# Patient Record
Sex: MALE | Race: OTHER | Employment: PART TIME | URBAN - METROPOLITAN AREA
[De-identification: names, ages, dates, MRNs, and addresses within clinical notes are randomized per-mention and may not be internally consistent; named-entity substitution may affect disease eponyms.]

---

## 2019-05-17 ENCOUNTER — APPOINTMENT (OUTPATIENT)
Dept: GENERAL RADIOLOGY | Age: 39
End: 2019-05-17
Payer: COMMERCIAL

## 2019-05-17 ENCOUNTER — HOSPITAL ENCOUNTER (EMERGENCY)
Age: 39
Discharge: HOME | End: 2019-05-17
Payer: COMMERCIAL

## 2019-05-17 VITALS
DIASTOLIC BLOOD PRESSURE: 99 MMHG | RESPIRATION RATE: 14 BRPM | OXYGEN SATURATION: 100 % | HEART RATE: 73 BPM | TEMPERATURE: 98.9 F | SYSTOLIC BLOOD PRESSURE: 151 MMHG

## 2019-05-17 DIAGNOSIS — M65.9 FLEXOR TENOSYNOVITIS OF FINGER: Primary | ICD-10-CM

## 2019-05-17 PROCEDURE — 73140 X-RAY EXAM OF FINGER(S): CPT

## 2019-05-17 PROCEDURE — 99283 EMERGENCY DEPT VISIT LOW MDM: CPT

## 2019-05-17 PROCEDURE — 6370000000 HC RX 637 (ALT 250 FOR IP): Performed by: NURSE PRACTITIONER

## 2019-05-17 RX ORDER — IBUPROFEN 800 MG/1
800 TABLET ORAL EVERY 8 HOURS PRN
Qty: 20 TABLET | Refills: 0 | Status: SHIPPED | OUTPATIENT
Start: 2019-05-17

## 2019-05-17 RX ORDER — LISINOPRIL 10 MG/1
10 TABLET ORAL DAILY
COMMUNITY

## 2019-05-17 RX ORDER — IBUPROFEN 800 MG/1
800 TABLET ORAL ONCE
Status: COMPLETED | OUTPATIENT
Start: 2019-05-17 | End: 2019-05-17

## 2019-05-17 RX ADMIN — IBUPROFEN 800 MG: 800 TABLET, FILM COATED ORAL at 09:07

## 2019-05-17 ASSESSMENT — ENCOUNTER SYMPTOMS
ABDOMINAL PAIN: 0
NAUSEA: 0
SHORTNESS OF BREATH: 0
DIARRHEA: 0
CHEST TIGHTNESS: 0
VOMITING: 0

## 2019-05-17 ASSESSMENT — PAIN DESCRIPTION - ORIENTATION: ORIENTATION: RIGHT

## 2019-05-17 ASSESSMENT — PAIN SCALES - GENERAL
PAINLEVEL_OUTOF10: 8
PAINLEVEL_OUTOF10: 8

## 2019-05-17 ASSESSMENT — PAIN DESCRIPTION - LOCATION: LOCATION: HAND

## 2019-05-17 NOTE — ED PROVIDER NOTES
difficult to straighten   All other systems reviewed and are negative. Positives and Pertinent negatives as per HPI. Except as noted abovein the ROS, all other systems were reviewed and negative. PAST MEDICAL HISTORY   History reviewed. No pertinent past medical history. SURGICAL HISTORY   History reviewed. No pertinent surgical history. [address]       Discharge Medication List as of 5/17/2019  9:12 AM      CONTINUE these medications which have NOT CHANGED    Details   lisinopril (PRINIVIL;ZESTRIL) 10 MG tablet Take 10 mg by mouth dailyHistorical Med               ALLERGIES     Patient has no known allergies. FAMILYHISTORY     History reviewed. No pertinent family history.        SOCIAL HISTORY       Social History     Socioeconomic History    Marital status: Single     Spouse name: None    Number of children: None    Years of education: None    Highest education level: None   Occupational History    None   Social Needs    Financial resource strain: None    Food insecurity:     Worry: None     Inability: None    Transportation needs:     Medical: None     Non-medical: None   Tobacco Use    Smoking status: Never Smoker    Smokeless tobacco: Never Used   Substance and Sexual Activity    Alcohol use: None    Drug use: None    Sexual activity: None   Lifestyle    Physical activity:     Days per week: None     Minutes per session: None    Stress: None   Relationships    Social connections:     Talks on phone: None     Gets together: None     Attends Jainism service: None     Active member of club or organization: None     Attends meetings of clubs or organizations: None     Relationship status: None    Intimate partner violence:     Fear of current or ex partner: None     Emotionally abused: None     Physically abused: None     Forced sexual activity: None   Other Topics Concern    None   Social History Narrative    None       SCREENINGS             PHYSICAL EXAM (up to 7 for level 4, 8 or more for level 5)     ED Triage Vitals [05/17/19 0806]   BP Temp Temp Source Pulse Resp SpO2 Height Weight   (!) 151/99 98.9 °F (37.2 °C) Infrared 73 14 100 % -- --       Physical Exam   Constitutional: He is oriented to person, place, and time. He appears well-developed and well-nourished. HENT:   Head: Normocephalic and atraumatic. Right Ear: External ear normal.   Left Ear: External ear normal.   Eyes: Right eye exhibits no discharge. Left eye exhibits no discharge. Neck: Normal range of motion. Neck supple. No JVD present. Cardiovascular: Normal rate and regular rhythm. Exam reveals no friction rub. No murmur heard. Pulmonary/Chest: Effort normal and breath sounds normal. No stridor. No respiratory distress. He has no wheezes. Abdominal: Soft. He exhibits no mass. There is no tenderness. Musculoskeletal: Normal range of motion. Hands:  Neurological: He is alert and oriented to person, place, and time. Skin: Skin is warm and dry. He is not diaphoretic. No pallor. Psychiatric: He has a normal mood and affect. His behavior is normal.   Nursing note and vitals reviewed. DIAGNOSTIC RESULTS   LABS:    Labs Reviewed - No data to display    All other labs were within normal range or not returned as of this dictation. EKG: All EKG's are interpreted by the Emergency Department Physician who either signs orCo-signs this chart in the absence of a cardiologist.  Please see their note for interpretation of EKG. RADIOLOGY:   Non-plain film images such as CT, Ultrasound and MRI are read by the radiologist. Plain radiographic images are visualized andpreliminarily interpreted by the  ED Provider with the below findings:        Interpretation [facility] Radiologist below, if available at the time of this note:    XR FINGER RIGHT (MIN 2 VIEWS)   Final Result   No acute osseous or soft tissue abnormality. No results found.        PROCEDURES   Unless otherwise noted below, none     Procedures    CRITICAL CARE TIME   N/A    CONSULTS:  None      EMERGENCY DEPARTMENT COURSE and DIFFERENTIALDIAGNOSIS/MDM:   Vitals:    Vitals:    05/17/19 0806   BP: (!) 151/99   Pulse: 73   Resp: 14   Temp: 98.9 °F (37.2 °C)   TempSrc: Infrared   SpO2: 100%       Patient was given thefollowing medications:  Medications   ibuprofen (ADVIL;MOTRIN) tablet 800 mg (800 mg Oral Given 5/17/19 0907)       Briefly, this is a 41year old male that presents to the emergency department with the right ring finger in a flexed locked position. States that this occurs every morning upon wakening for the past 1 week estimated newly straightened although it will not remain in the extended position. He reports it is painful at the base of the finger. Denies injury or trauma. There is no swelling. Denies history of previous event. He is right-hand dominant. Patient was given Motrin. X-ray shows no acute osseous abnormality or soft tissue abnormality. He is placed in an auto form splint. This is classic trigger finger. He is instructed on rehab exercises and follow up with orthopedics as well as splinting. He does verbalize understanding. I estimate there is LOW risk for FRACTURE, COMPARTMENT SYNDROME, DEEP VENOUS THROMBOSIS, SEPTIC ARTHRITIS, TENDON OR NEUROVASCULAR INJURY, thus I consider the discharge disposition reasonable. FINAL IMPRESSION      1.  Flexor tenosynovitis of finger          DISPOSITION/PLAN   DISPOSITION Decision To Discharge 05/17/2019 08:49:26 AM      PATIENT REFERREDTO:  Amanda Galvez MD  [address] [place] [address], [address]  [address] [place] [address]  [phone]    Schedule an appointment as soon as possible for a visit         DISCHARGE MEDICATIONS:  Discharge Medication List as of 5/17/2019  9:12 AM      START taking these medications    Details   ibuprofen (ADVIL;MOTRIN) 800 MG tablet Take 1 tablet by mouth every 8 hours as needed for Pain or Fever, Disp-20 tablet, R-0Print DISCONTINUED MEDICATIONS:  Discharge Medication List as of 5/17/2019  9:12 AM                 (Please note that portions ofthis note were completed with a voice recognition program.  Efforts were made to edit the dictations but occasionally words are mis-transcribed.)    TONI Agrawal CNP (electronically signed)           TONI Agrawal CNP  05/17/19 7392

## 2019-05-17 NOTE — ED NOTES
Pt. discharged in stable position. Pt. provided with discharge instructions and prescriptions. Given opportunity to ask questions if needed and verbalized understanding. Pt. Ambulated to exit per self.         Orlando Rico RN  05/17/19 4050

## 2019-08-14 ENCOUNTER — OFFICE VISIT (OUTPATIENT)
Dept: ORTHOPEDIC SURGERY | Age: 39
End: 2019-08-14
Payer: COMMERCIAL

## 2019-08-14 VITALS
DIASTOLIC BLOOD PRESSURE: 83 MMHG | SYSTOLIC BLOOD PRESSURE: 120 MMHG | RESPIRATION RATE: 16 BRPM | BODY MASS INDEX: 33.13 KG/M2 | WEIGHT: 180 LBS | HEIGHT: 62 IN

## 2019-08-14 DIAGNOSIS — M65.341 TRIGGER RING FINGER OF RIGHT HAND: ICD-10-CM

## 2019-08-14 PROCEDURE — 20550 NJX 1 TENDON SHEATH/LIGAMENT: CPT | Performed by: PHYSICIAN ASSISTANT

## 2019-08-14 PROCEDURE — G8427 DOCREV CUR MEDS BY ELIG CLIN: HCPCS | Performed by: PHYSICIAN ASSISTANT

## 2019-08-14 PROCEDURE — 1036F TOBACCO NON-USER: CPT | Performed by: PHYSICIAN ASSISTANT

## 2019-08-14 PROCEDURE — G8419 CALC BMI OUT NRM PARAM NOF/U: HCPCS | Performed by: PHYSICIAN ASSISTANT

## 2019-08-14 PROCEDURE — 99203 OFFICE O/P NEW LOW 30 MIN: CPT | Performed by: PHYSICIAN ASSISTANT

## 2019-08-14 NOTE — PATIENT INSTRUCTIONS
Information & Instructions   After Finger, Hand, Wrist, or Elbow Injection    Doug Nix MD    You have received an injection of local anesthetic (Bupivicaine without Epinephrine) for comfort & a steroid (Kenalog) for its strong anti-inflammatory effects. In order to give the medication a chance to reduce your inflammation and discomfort, it is recommended that you take it easy for a day or so. You may use your hand and arm as you feel comfortable, but you should avoid highly strenuous activity and heavy use for several days. Relief from the injection will often not begin for several days, and you may not feel full relief for up to one month. It is not uncommon to experience some local discomfort or pain at or around the injection site for a few days. To relieve these symptoms you may do the following if you feel necessary:       Apply ice to the affected area 20 minutes on and 20 minutes off. Do not apply ice directly to the skin. Use a thin layer (T-shirt, pillowcase, towel, etc.) to protect the skin. - If allowed by your other medical physicians, you may take -     2 Tylenol extra strength tablets every 4-6 hours       1-2 Aleve tablets twice a day     2-3 Advil tablets two to three times a day    If you are diabetic, the steroid medication may increase your blood sugar, so you are advised to monitor your sugar more closely so you can adjust it accordingly for a few days following your injection. If you need assistance with the control of you blood sugar, please contact you primary care physician for further advice. I will request that you please call the office one month after your injection at [phone] if you have not experienced relief of your symptoms (unless I have instructed you otherwise). If your injection has given you good relief of you symptoms as expected, then you only need to call the office if your symptoms return.

## 2019-08-14 NOTE — LETTER
Witness               o  Parent  o  Guardian   o  Spouse       o  Other (specify)                                    Patient Name: More Sharp  Patient YOB: 1980    Dr. Karli Duval' Return To Work Policy  Regarding your ability to return to work after surgery or injury, Dr. Karli Duval will not state that any patient is off of work or cannot work at all. He will place you on restrictions after your surgical procedure or injury. This means that you will be allowed to return to work the day after your office visit or surgery with restrictions. Depending on the details of your particular situation, Dr. Karli Duval may state that you will have either light use or no use of your hand for a specific number of weeks. It is your obligation to communicate with your employer regarding your restrictions. It is your employer's decision as to whether they will accommodate your restrictions (i.e. allow you to come to work in your restricted capacity) or to not allow you to return to work under your restrictions. Dr. Karli Duval does not participate in making this decision and cannot influence your employer regarding their decision. If you do not communicate your restrictions to your employer, or if you do not present to work as you are scheduled to, Dr. Karli Duval will not provide an 'excuse' to explain your absence. A doctors note, or official forms (BWC, FMLA, etc.) will be filled out, upon request, to indicate your date of surgery and your restrictions as stated above. Dr. Karli Duval' Narcotic Policy  Patients will only be prescribed narcotics after surgical procedures or significant injury. Not all procedures cause pain great enough to require Narcotics and thus, not all patients will receive prescriptions after surgical procedures or injuries. Narcotics are never prescribed for chronic conditions.  Narcotics are never prescribed for use longer than one

## 2019-11-04 ENCOUNTER — ANESTHESIA EVENT (OUTPATIENT)
Dept: OPERATING ROOM | Age: 39
End: 2019-11-04
Payer: COMMERCIAL

## 2019-11-05 ENCOUNTER — ANESTHESIA (OUTPATIENT)
Dept: OPERATING ROOM | Age: 39
End: 2019-11-05
Payer: COMMERCIAL

## 2019-11-05 ENCOUNTER — HOSPITAL ENCOUNTER (OUTPATIENT)
Age: 39
Setting detail: OUTPATIENT SURGERY
Discharge: HOME | End: 2019-11-05
Attending: ORTHOPAEDIC SURGERY | Admitting: ORTHOPAEDIC SURGERY
Payer: COMMERCIAL

## 2019-11-05 VITALS
RESPIRATION RATE: 18 BRPM | TEMPERATURE: 97.2 F | DIASTOLIC BLOOD PRESSURE: 96 MMHG | OXYGEN SATURATION: 99 % | HEART RATE: 69 BPM | WEIGHT: 200 LBS | SYSTOLIC BLOOD PRESSURE: 116 MMHG | HEIGHT: 66 IN | BODY MASS INDEX: 32.14 KG/M2

## 2019-11-05 VITALS — SYSTOLIC BLOOD PRESSURE: 125 MMHG | DIASTOLIC BLOOD PRESSURE: 71 MMHG | OXYGEN SATURATION: 96 %

## 2019-11-05 PROCEDURE — 7100000011 HC PHASE II RECOVERY - ADDTL 15 MIN: Performed by: ORTHOPAEDIC SURGERY

## 2019-11-05 PROCEDURE — 3700000000 HC ANESTHESIA ATTENDED CARE: Performed by: ORTHOPAEDIC SURGERY

## 2019-11-05 PROCEDURE — 7100000010 HC PHASE II RECOVERY - FIRST 15 MIN: Performed by: ORTHOPAEDIC SURGERY

## 2019-11-05 PROCEDURE — 2580000003 HC RX 258: Performed by: ANESTHESIOLOGY

## 2019-11-05 PROCEDURE — 7100000001 HC PACU RECOVERY - ADDTL 15 MIN: Performed by: ORTHOPAEDIC SURGERY

## 2019-11-05 PROCEDURE — 2500000003 HC RX 250 WO HCPCS: Performed by: NURSE ANESTHETIST, CERTIFIED REGISTERED

## 2019-11-05 PROCEDURE — 2500000003 HC RX 250 WO HCPCS: Performed by: ORTHOPAEDIC SURGERY

## 2019-11-05 PROCEDURE — 3600000005 HC SURGERY LEVEL 5 BASE: Performed by: ORTHOPAEDIC SURGERY

## 2019-11-05 PROCEDURE — 7100000000 HC PACU RECOVERY - FIRST 15 MIN: Performed by: ORTHOPAEDIC SURGERY

## 2019-11-05 PROCEDURE — 6360000002 HC RX W HCPCS: Performed by: NURSE ANESTHETIST, CERTIFIED REGISTERED

## 2019-11-05 PROCEDURE — 2709999900 HC NON-CHARGEABLE SUPPLY: Performed by: ORTHOPAEDIC SURGERY

## 2019-11-05 RX ORDER — SODIUM CHLORIDE 0.9 % (FLUSH) 0.9 %
10 SYRINGE (ML) INJECTION EVERY 12 HOURS SCHEDULED
Status: DISCONTINUED | OUTPATIENT
Start: 2019-11-05 | End: 2019-11-05 | Stop reason: HOSPADM

## 2019-11-05 RX ORDER — PROPOFOL 10 MG/ML
INJECTION, EMULSION INTRAVENOUS PRN
Status: DISCONTINUED | OUTPATIENT
Start: 2019-11-05 | End: 2019-11-05 | Stop reason: SDUPTHER

## 2019-11-05 RX ORDER — LIDOCAINE HYDROCHLORIDE 20 MG/ML
INJECTION, SOLUTION INFILTRATION; PERINEURAL PRN
Status: DISCONTINUED | OUTPATIENT
Start: 2019-11-05 | End: 2019-11-05 | Stop reason: SDUPTHER

## 2019-11-05 RX ORDER — SODIUM CHLORIDE 0.9 % (FLUSH) 0.9 %
10 SYRINGE (ML) INJECTION PRN
Status: DISCONTINUED | OUTPATIENT
Start: 2019-11-05 | End: 2019-11-05 | Stop reason: HOSPADM

## 2019-11-05 RX ORDER — IBUPROFEN 400 MG/1
800 TABLET ORAL
Status: DISCONTINUED | OUTPATIENT
Start: 2019-11-05 | End: 2019-11-05 | Stop reason: HOSPADM

## 2019-11-05 RX ORDER — ONDANSETRON 2 MG/ML
4 INJECTION INTRAMUSCULAR; INTRAVENOUS
Status: DISCONTINUED | OUTPATIENT
Start: 2019-11-05 | End: 2019-11-05 | Stop reason: HOSPADM

## 2019-11-05 RX ORDER — SODIUM CHLORIDE 9 MG/ML
INJECTION, SOLUTION INTRAVENOUS CONTINUOUS
Status: DISCONTINUED | OUTPATIENT
Start: 2019-11-05 | End: 2019-11-05 | Stop reason: HOSPADM

## 2019-11-05 RX ADMIN — SODIUM CHLORIDE: 9 INJECTION, SOLUTION INTRAVENOUS at 06:37

## 2019-11-05 RX ADMIN — LIDOCAINE HYDROCHLORIDE 50 MG: 20 INJECTION, SOLUTION INFILTRATION; PERINEURAL at 07:25

## 2019-11-05 RX ADMIN — PROPOFOL 175 MG: 10 INJECTION, EMULSION INTRAVENOUS at 07:25

## 2019-11-05 ASSESSMENT — PAIN DESCRIPTION - PAIN TYPE
TYPE: ACUTE PAIN
TYPE: ACUTE PAIN

## 2019-11-05 ASSESSMENT — PAIN SCALES - GENERAL
PAINLEVEL_OUTOF10: 0
PAINLEVEL_OUTOF10: 0
PAINLEVEL_OUTOF10: 3
PAINLEVEL_OUTOF10: 3

## 2019-11-05 ASSESSMENT — PULMONARY FUNCTION TESTS
PIF_VALUE: 0
PIF_VALUE: 1
PIF_VALUE: 0
PIF_VALUE: 1

## 2019-11-05 ASSESSMENT — PAIN DESCRIPTION - LOCATION
LOCATION: HAND
LOCATION: HAND

## 2019-11-05 ASSESSMENT — ENCOUNTER SYMPTOMS: SHORTNESS OF BREATH: 0

## 2019-11-05 ASSESSMENT — PAIN DESCRIPTION - PROGRESSION
CLINICAL_PROGRESSION: NOT CHANGED
CLINICAL_PROGRESSION: GRADUALLY WORSENING

## 2019-11-05 ASSESSMENT — PAIN - FUNCTIONAL ASSESSMENT
PAIN_FUNCTIONAL_ASSESSMENT: ACTIVITIES ARE NOT PREVENTED
PAIN_FUNCTIONAL_ASSESSMENT: ACTIVITIES ARE NOT PREVENTED
PAIN_FUNCTIONAL_ASSESSMENT: 0-10

## 2019-11-05 ASSESSMENT — PAIN DESCRIPTION - ONSET
ONSET: ON-GOING
ONSET: ON-GOING

## 2019-11-05 ASSESSMENT — PAIN DESCRIPTION - DESCRIPTORS
DESCRIPTORS: ACHING
DESCRIPTORS: ACHING

## 2019-11-05 ASSESSMENT — PAIN DESCRIPTION - ORIENTATION
ORIENTATION: RIGHT
ORIENTATION: RIGHT

## 2019-11-05 ASSESSMENT — PAIN DESCRIPTION - FREQUENCY
FREQUENCY: INTERMITTENT
FREQUENCY: INTERMITTENT

## 2019-11-13 ENCOUNTER — OFFICE VISIT (OUTPATIENT)
Dept: ORTHOPEDIC SURGERY | Age: 39
End: 2019-11-13

## 2019-11-13 VITALS — BODY MASS INDEX: 33.13 KG/M2 | WEIGHT: 180 LBS | RESPIRATION RATE: 16 BRPM | HEIGHT: 62 IN

## 2019-11-13 DIAGNOSIS — M65.341 TRIGGER RING FINGER OF RIGHT HAND: Primary | ICD-10-CM

## 2019-11-13 PROCEDURE — APPNB15 APP NON BILLABLE TIME 0-15 MINS: Performed by: PHYSICIAN ASSISTANT

## 2019-11-13 PROCEDURE — 99024 POSTOP FOLLOW-UP VISIT: CPT | Performed by: PHYSICIAN ASSISTANT

## 2019-11-13 RX ORDER — AMLODIPINE BESYLATE 10 MG/1
TABLET ORAL
COMMUNITY

## 2019-11-13 RX ORDER — LISINOPRIL AND HYDROCHLOROTHIAZIDE 12.5; 1 MG/1; MG/1
TABLET ORAL
Refills: 1 | COMMUNITY
Start: 2019-11-11

## 2019-11-13 RX ORDER — LISINOPRIL 5 MG/1
TABLET ORAL
COMMUNITY

## 2019-11-13 RX ORDER — ATORVASTATIN CALCIUM 40 MG/1
TABLET, FILM COATED ORAL
COMMUNITY
Start: 2019-09-09

## 2020-01-04 ENCOUNTER — HOSPITAL ENCOUNTER (EMERGENCY)
Age: 40
Discharge: HOME | End: 2020-01-05
Payer: COMMERCIAL

## 2020-01-04 LAB
BILIRUBIN URINE: NEGATIVE
BLOOD, URINE: ABNORMAL
CLARITY: CLEAR
COLOR: YELLOW
EPITHELIAL CELLS, UA: 0 /HPF (ref 0–5)
GLUCOSE URINE: NEGATIVE MG/DL
HYALINE CASTS: 2 /LPF (ref 0–8)
KETONES, URINE: NEGATIVE MG/DL
LEUKOCYTE ESTERASE, URINE: NEGATIVE
MICROSCOPIC EXAMINATION: YES
NITRITE, URINE: NEGATIVE
PH UA: 5 (ref 5–8)
PROTEIN UA: 100 MG/DL
RAPID INFLUENZA  B AGN: NEGATIVE
RAPID INFLUENZA A AGN: NEGATIVE
RBC UA: 1 /HPF (ref 0–4)
SPECIFIC GRAVITY UA: 1.01 (ref 1–1.03)
URINE REFLEX TO CULTURE: ABNORMAL
URINE TYPE: ABNORMAL
UROBILINOGEN, URINE: 0.2 E.U./DL
WBC UA: 0 /HPF (ref 0–5)

## 2020-01-04 PROCEDURE — 99284 EMERGENCY DEPT VISIT MOD MDM: CPT

## 2020-01-04 PROCEDURE — 87804 INFLUENZA ASSAY W/OPTIC: CPT

## 2020-01-04 PROCEDURE — 87880 STREP A ASSAY W/OPTIC: CPT

## 2020-01-04 PROCEDURE — 6370000000 HC RX 637 (ALT 250 FOR IP): Performed by: PHYSICIAN ASSISTANT

## 2020-01-04 PROCEDURE — 81001 URINALYSIS AUTO W/SCOPE: CPT

## 2020-01-04 RX ORDER — IBUPROFEN 800 MG/1
800 TABLET ORAL ONCE
Status: COMPLETED | OUTPATIENT
Start: 2020-01-04 | End: 2020-01-04

## 2020-01-04 RX ORDER — ACETAMINOPHEN 500 MG
1000 TABLET ORAL ONCE
Status: COMPLETED | OUTPATIENT
Start: 2020-01-04 | End: 2020-01-04

## 2020-01-04 RX ADMIN — ACETAMINOPHEN 1000 MG: 500 TABLET ORAL at 23:23

## 2020-01-04 RX ADMIN — IBUPROFEN 800 MG: 800 TABLET, FILM COATED ORAL at 23:23

## 2020-01-04 ASSESSMENT — PAIN DESCRIPTION - LOCATION: LOCATION: HEAD

## 2020-01-04 ASSESSMENT — PAIN SCALES - GENERAL
PAINLEVEL_OUTOF10: 10
PAINLEVEL_OUTOF10: 10

## 2020-01-05 ENCOUNTER — APPOINTMENT (OUTPATIENT)
Dept: GENERAL RADIOLOGY | Age: 40
End: 2020-01-05
Payer: COMMERCIAL

## 2020-01-05 VITALS
SYSTOLIC BLOOD PRESSURE: 145 MMHG | WEIGHT: 205.13 LBS | DIASTOLIC BLOOD PRESSURE: 87 MMHG | RESPIRATION RATE: 16 BRPM | BODY MASS INDEX: 37.52 KG/M2 | OXYGEN SATURATION: 98 % | HEART RATE: 116 BPM | TEMPERATURE: 98.5 F

## 2020-01-05 LAB — S PYO AG THROAT QL: POSITIVE

## 2020-01-05 PROCEDURE — 71046 X-RAY EXAM CHEST 2 VIEWS: CPT

## 2020-01-05 RX ORDER — AZITHROMYCIN 250 MG/1
250 TABLET, FILM COATED ORAL SEE ADMIN INSTRUCTIONS
Qty: 6 TABLET | Refills: 0 | Status: SHIPPED | OUTPATIENT
Start: 2020-01-05 | End: 2020-01-10

## 2020-01-05 RX ORDER — CYCLOBENZAPRINE HCL 10 MG
10 TABLET ORAL 3 TIMES DAILY PRN
Qty: 20 TABLET | Refills: 0 | Status: SHIPPED | OUTPATIENT
Start: 2020-01-05 | End: 2020-01-12

## 2020-01-05 RX ORDER — PENICILLIN V POTASSIUM 500 MG/1
500 TABLET ORAL 2 TIMES DAILY
Qty: 20 TABLET | Refills: 0 | Status: SHIPPED | OUTPATIENT
Start: 2020-01-05 | End: 2020-01-05

## 2020-01-05 ASSESSMENT — ENCOUNTER SYMPTOMS
ABDOMINAL PAIN: 0
VOMITING: 0
NAUSEA: 0
SHORTNESS OF BREATH: 0

## 2020-01-05 NOTE — ED PROVIDER NOTES
Musculoskeletal: Negative. Skin: Negative. Neurological: Negative. Positives and Pertinent negatives as per HPI. Except as noted above in the ROS, all other systems were reviewed and negative. PAST MEDICAL HISTORY     Past Medical History:   Diagnosis Date    Claustrophobia     Hypertension          SURGICAL HISTORY     Past Surgical History:   Procedure Laterality Date    FINGER TRIGGER RELEASE Right 11/05/2019    RIGHT RING FINGER TRIGGER FINGER RELEASE (Right )    FINGER TRIGGER RELEASE Right 11/5/2019    RIGHT RING FINGER TRIGGER FINGER RELEASE performed by Katherine Holbrook MD at [address] [place] [address]       Previous Medications    AMLODIPINE (NORVASC) 10 MG TABLET    amlodipine 10 mg tablet   Take 1 tablet every day by oral route in the morning. ATORVASTATIN (LIPITOR) 40 MG TABLET        IBUPROFEN (ADVIL;MOTRIN) 800 MG TABLET    Take 1 tablet by mouth every 8 hours as needed for Pain or Fever    LISINOPRIL (PRINIVIL;ZESTRIL) 10 MG TABLET    Take 10 mg by mouth daily    LISINOPRIL (PRINIVIL;ZESTRIL) 5 MG TABLET    lisinopril 5 mg tablet   Take 1 tablet every day by oral route. LISINOPRIL-HYDROCHLOROTHIAZIDE (PRINZIDE;ZESTORETIC) 10-12.5 MG PER TABLET    TK 1 T PO  QD    METOPROLOL TARTRATE (LOPRESSOR) 25 MG TABLET    25 mg 2 times daily Has not started yet wants to wait until after surgery         ALLERGIES     Patient has no known allergies.     FAMILYHISTORY       Family History   Problem Relation Age of Onset    High Blood Pressure Mother     High Blood Pressure Father           SOCIAL HISTORY       Social History     Tobacco Use    Smoking status: Never Smoker    Smokeless tobacco: Never Used   Substance Use Topics    Alcohol use: Yes     Comment: rarely    Drug use: Never       SCREENINGS             PHYSICAL EXAM    (up to 7 for level 4, 8 or more for level 5)     ED Triage Vitals [01/04/20 2049]   BP Temp Temp Source Pulse Resp SpO2 Height Weight   (!) 145/87 99 °F (37.2 °C) Infrared 137 16 96 % -- 205 lb 2 oz (93 kg)       Physical Exam  Vitals signs and nursing note reviewed. Constitutional:       General: He is not in acute distress. Appearance: Normal appearance. He is well-developed. He is ill-appearing. He is not toxic-appearing or diaphoretic. HENT:      Head: Normocephalic and atraumatic. Right Ear: Tympanic membrane, ear canal and external ear normal.      Left Ear: Tympanic membrane, ear canal and external ear normal.      Nose: Nose normal.      Mouth/Throat:      Mouth: Mucous membranes are moist.      Pharynx: Posterior oropharyngeal erythema present. No pharyngeal swelling, oropharyngeal exudate or uvula swelling. Tonsils: Tonsillar exudate present. No tonsillar abscesses. Swellin+ on the right. 1+ on the left. Eyes:      General:         Right eye: No discharge. Left eye: No discharge. Neck:      Musculoskeletal: Normal range of motion and neck supple. Cardiovascular:      Rate and Rhythm: Regular rhythm. Tachycardia present. Heart sounds: Normal heart sounds. No murmur. No gallop. Pulmonary:      Effort: Pulmonary effort is normal. No respiratory distress. Breath sounds: Normal breath sounds. No wheezing or rales. Musculoskeletal: Normal range of motion. Skin:     General: Skin is warm and dry. Capillary Refill: Capillary refill takes less than 2 seconds. Coloration: Skin is not pale. Neurological:      General: No focal deficit present. Mental Status: He is alert and oriented to person, place, and time.    Psychiatric:         Behavior: Behavior normal.         DIAGNOSTIC RESULTS   LABS:    Labs Reviewed   STREP SCREEN GROUP A THROAT - Abnormal; Notable for the following components:       Result Value    Rapid Strep A Screen POSITIVE (*)     All other components within normal limits    Narrative:     Performed at:  [facility]  [address], [place], [facility]   Phone [phone]   URINE RT REFLEX TO CULTURE - Abnormal; Notable for the following components:    Blood, Urine SMALL (*)     Protein,  (*)     All other components within normal limits    Narrative:     Performed at:  [facility]  [address]  [place], [facility]   Phone [phone]   RAPID INFLUENZA A/B ANTIGENS    Narrative:     Performed at:  [facility]  [address], [facility]   Phone [phone]   MICROSCOPIC URINALYSIS    Narrative:     Performed at:  [facility]  [address] [facility], [place] [employer]   Phone [phone]       All other labs were within normal range or not returned as of this dictation. EKG: All EKG's are interpreted by the Emergency Department Physician in the absence of a cardiologist.  Please see their note for interpretation of EKG. RADIOLOGY:   Non-plain film images such as CT, Ultrasound and MRI are read by the radiologist. Plain radiographic images are visualized and preliminarily interpreted by the  ED Provider with the below findings:        Interpretation per the Radiologist below, if available at the time of this note:    No orders to display     No results found.         PROCEDURES   Unless otherwise noted below, none     Procedures    CRITICAL CARE TIME   N/A    CONSULTS:  None      EMERGENCY DEPARTMENT COURSE and DIFFERENTIAL DIAGNOSIS/MDM:   Vitals:    Vitals:    01/04/20 2049 01/04/20 2304 01/04/20 2307   BP: (!) 145/87     Pulse: 137  104   Resp: 16     Temp: 99 °F (37.2 °C) 101 °F (38.3 °C)    TempSrc: Infrared Oral    SpO2: 96%     Weight: 205 lb 2 oz (93 kg)         Patient was given thefollowing medications:  Medications   acetaminophen (TYLENOL) tablet 1,000 mg (1,000 mg Oral Given 1/4/20 2323)   ibuprofen (ADVIL;MOTRIN) tablet 800 mg (800 mg Oral Given 1/4/20 2323)     Patient presents the emergency department for evaluation of fever, headache, sore throat, cough and sciatic-like back pain. Patient is alert and oriented no acute distress. He does appear acutely ill but nontoxic. Patient's heart rate is slightly tachycardic without murmurs rubs or gallops. Patient had a temperature of 99 at triage however oral temperature was repeated by myself at 101 degrees. Patient is given Tylenol and Motrin. He is given 1 L of p.o. fluids. Influenza testing is negative. Patient is not having any difficulty breathing or swallowing. Lungs sound clear to auscultation bilaterally. Posterior oropharynx is slightly erythematous with mild exudate and swelling. Patient does have tenderness to the left buttocks in the distribution of the sciatic nerve. No cervical thoracic or lumbar spine no tenderness. Patient denies any saddle anesthesia, loss of bowel or bladder numbness or tingling in his feet. Patient has no red flag back symptoms including IV drug use. I suspect sciatica. Patient will be treated with Tylenol, ibuprofen and Flexeril outpatient for his symptoms. Patient encouraged to follow-up with his primary care doctor regarding sciatic pain if it does not go away after he feels better after fever breaks. Patient will be treated for strep pharyngitis with penicillin VK. Patient is amenable to this outpatient plan will be discharged home. FINAL IMPRESSION      1. Sciatica of left side    2.  Strep pharyngitis          DISPOSITION/PLAN   DISPOSITION Decision To Discharge 01/04/2020 11:06:01 PM      PATIENT REFERREDTO:  [facility] Emergency Department  [address] [facility]  [phone]    If symptoms worsen    your PCP            DISCHARGE MEDICATIONS:  New Prescriptions    PENICILLIN V POTASSIUM (VEETID) 500 MG TABLET    Take 1 tablet by mouth 2 times daily for 10 days       DISCONTINUED MEDICATIONS:  Discontinued Medications    No medications on file              (Please note

## 2020-01-05 NOTE — ED NOTES
Discharge instructions reviewed with pt, pt verbalized understanding  Patient ambulatory to exit without difficulty     Margie Ignacio, ROBERTA  01/05/20 0565

## (undated) DEVICE — GLOVE SURG SZ 8 L12IN FNGR THK94MIL STD WHT LTX SYN POLYMER

## (undated) DEVICE — DRESSING PETRO W3XL3IN OIL EMUL N ADH GZ KNIT IMPREG CELOS

## (undated) DEVICE — GOWN,SIRUS,POLYRNF,BRTHSLV,XL,30/CS: Brand: MEDLINE

## (undated) DEVICE — MINOR SET UP PK

## (undated) DEVICE — UNDERGLOVE SURG SZ 8 FNGR THK0.21MIL GRN LTX BEAD CUF

## (undated) DEVICE — Z DISCONTINUED USE 2275676 GLOVE SURG SZ 65 L12IN FNGR THK87MIL DK GRN LTX FREE ISOLEX

## (undated) DEVICE — NEEDLE HYPO 18GA L1.5IN THN WALL PIVOTING SHLD BVL ORIENTED

## (undated) DEVICE — DRAPE HND W114XL142IN BLU POLYPR W O PCH FEN CRD AND TB HLDR

## (undated) DEVICE — GLOVE SURG SZ 65 L12IN FNGR THK87MIL WHT LTX FREE

## (undated) DEVICE — NEEDLE HYPO 25GA L1.5IN BVL ORIENTED ECLIPSE

## (undated) DEVICE — SHEET,DRAPE,53X77,STERILE: Brand: MEDLINE

## (undated) DEVICE — SPONGE GZ W4XL4IN COT 12 PLY TYP VII WVN C FLD DSGN

## (undated) DEVICE — SOLUTION IV IRRIG 250ML ST LF 0.9% SODIUM 2F7122

## (undated) DEVICE — BANDAGE COMPR W2INXL5YD TAN BRTH SELF ADH WRP W/ HND TEAR

## (undated) DEVICE — SUTURE CHROMIC GUT SZ 4-0 L27IN ABSRB BRN FS-2 L19MM 3/8 635H

## (undated) DEVICE — SYRINGE MED 10ML LUERLOCK TIP W/O SFTY DISP

## (undated) DEVICE — GOWN SIRUS NONREIN XL W/TWL: Brand: MEDLINE INDUSTRIES, INC.

## (undated) DEVICE — BANDAGE COMPR W4INXL12FT E DISP ESMARCH EVEN

## (undated) DEVICE — COVER LT HNDL BLU PLAS

## (undated) DEVICE — CHLORAPREP 26ML ORANGE

## (undated) DEVICE — ZIMMER® STERILE DISPOSABLE TOURNIQUET CUFF WITH PLC, DUAL PORT, SINGLE BLADDER, 18 IN. (46 CM)